# Patient Record
Sex: MALE | Race: AMERICAN INDIAN OR ALASKA NATIVE
[De-identification: names, ages, dates, MRNs, and addresses within clinical notes are randomized per-mention and may not be internally consistent; named-entity substitution may affect disease eponyms.]

---

## 2018-09-18 ENCOUNTER — HOSPITAL ENCOUNTER (EMERGENCY)
Dept: HOSPITAL 42 - ED | Age: 28
Discharge: HOME | End: 2018-09-18
Payer: COMMERCIAL

## 2018-09-18 VITALS — HEART RATE: 64 BPM | TEMPERATURE: 98.1 F | RESPIRATION RATE: 18 BRPM

## 2018-09-18 VITALS — OXYGEN SATURATION: 98 % | SYSTOLIC BLOOD PRESSURE: 109 MMHG | DIASTOLIC BLOOD PRESSURE: 72 MMHG

## 2018-09-18 DIAGNOSIS — R07.81: Primary | ICD-10-CM

## 2018-09-18 PROCEDURE — 99284 EMERGENCY DEPT VISIT MOD MDM: CPT

## 2018-09-18 PROCEDURE — 96372 THER/PROPH/DIAG INJ SC/IM: CPT

## 2018-09-18 PROCEDURE — 71101 X-RAY EXAM UNILAT RIBS/CHEST: CPT

## 2018-09-18 NOTE — RAD
Date of service: 



09/18/2018



PROCEDURE:  Radiographs of the Chest and Right Ribs.



HISTORY:

ribs pain



COMPARISON:

None available. 



TECHNIQUE:

Frontal radiograph of the chest and multiple oblique radiographs of 

the right ribs were obtained.



FINDINGS:



RIGHT RIBS:

No fracture or focal lesion visualized.



LUNGS:

Clear.



PLEURA:

No pneumothorax or pleural fluid.



CARDIOVASCULAR:

Normal sized heart. No pulmonary vascular congestion.



OTHER FINDINGS:

None.



IMPRESSION:

Unremarkable radiographs of the chest and right ribs. No right rib 

fracture.

## 2018-09-18 NOTE — ED PDOC
Arrival/HPI





- General


Chief Complaint: Rib Injury


Time Seen by Provider: 09/18/18 12:14


Historian: Patient





- History of Present Illness


Narrative History of Present Illness (Text): 





09/18/18 13:50


27yo male with no pmhx who present with right sided anterior ribs pain x 4days. 

Notes that pain is with deep inspiration, sharp. Denies trauma, cough, SOB, 

diaphoresis, fever, chills, dizziness, any other complaint. Did not take any 

analgesic.





Past Medical History





- Provider Review


Nursing Documentation Reviewed: Yes





- Psychiatric


Hx Substance Use: No





Family/Social History





- Physician Review


Nursing Documentation Reviewed: Yes


Family/Social History: Unknown Family HX


Smoking Status: Never Smoked


Hx Alcohol Use: Yes


Frequency of alcohol use: Socially


Hx Substance Use: No





Allergies/Home Meds


Allergies/Adverse Reactions: 


Allergies





No Known Allergies Allergy (Verified 09/18/18 12:03)


 











Review of Systems





- Physician Review


All systems were reviewed & negative as marked: Yes





- Review of Systems


Constitutional: Normal


Eyes: Normal


ENT: Normal


Respiratory: Normal


Cardiovascular: Normal


Gastrointestinal: Normal


Genitourinary Male: Normal


Musculoskeletal: Arthralgias (Right sided rib pain)


Skin: Normal


Neurological: Normal


Endocrine: Normal


Hemo/Lymphatic: Normal


Psychiatric: Normal





Physical Exam


Vital Signs Reviewed: Yes





Vital Signs











  Temp Pulse Resp BP Pulse Ox


 


 09/18/18 12:00  98.1 F  64  18  120/74  99











Temperature: Afebrile


Blood Pressure: Normal


Pulse: Regular


Respiratory Rate: Normal


Appearance: Positive for: Well-Appearing, Non-Toxic, Comfortable


Pain Distress: None


Mental Status: Positive for: Alert and Oriented X 3





- Systems Exam


Head: Present: Atraumatic, Normocephalic


Pupils: Present: PERRL


Extroacular Muscles: Present: EOMI


Conjunctiva: Present: Normal


Mouth: Present: Moist Mucous Membranes


Neck: Present: Normal Range of Motion


Respiratory/Chest: Present: Clear to Auscultation, Good Air Exchange, Tender to 

Palpation (Focal right sided anterior lower rib tenderness).  No: Respiratory 

Distress, Accessory Muscle Use, Wheezes, Decreased Breath Sounds, Rales, 

Retracting, Rhonchi


Cardiovascular: Present: Regular Rate and Rhythm, Normal S1, S2.  No: Murmurs


Abdomen: No: Tenderness, Distention, Peritoneal Signs


Back: Present: Normal Inspection


Upper Extremity: Present: Normal Inspection.  No: Cyanosis, Edema


Lower Extremity: Present: Normal Inspection.  No: Edema


Neurological: Present: GCS=15, CN II-XII Intact, Speech Normal


Skin: Present: Warm, Dry, Normal Color.  No: Rashes


Psychiatric: Present: Alert, Oriented x 3, Normal Insight, Normal Concentration





Medical Decision Making


ED Course and Treatment: 





09/18/18 13:52


Pt in ED for stated history. In no distress. Hemodynamically stable. He had 

focal tenderness over right lower ribs.





right rib/CXR - No fracture/dislocation. No PTX





Pt's pain improved in ED with medication. Result was DW the pt and he was 

referred to his PMD.





- RAD Interpretation


Radiology Orders: 











09/18/18 12:18


RIBS RIGHT & PA CHEST [RAD] Stat 














- Medication Orders


Current Medication Orders: 














Discontinued Medications





Ketorolac Tromethamine (Toradol)  60 mg IM STAT STA


   Stop: 09/18/18 12:19


   Last Admin: 09/18/18 12:45  Dose: 60 mg





MAR Pain Assessment


 Document     09/18/18 12:45  GMD  (Rec: 09/18/18 12:45  GMD  [id number])


     Pain Reassessment


      Is this a pain reassessment?               No


IM Administration Charges


 Document     09/18/18 12:45  GMD  (Rec: 09/18/18 12:45  GMD  [id number])


     Injection Site


      MAR Injection Site                         Left Deltoid


     Charges for Administration


      # of IM Administrations                    1














Disposition/Present on Arrival





- Present on Arrival


Any Indicators Present on Arrival: No


History of DVT/PE: No


History of Uncontrolled Diabetes: No


Urinary Catheter: No


History of Decub. Ulcer: No


History Surgical Site Infection Following: None





- Disposition


Have Diagnosis and Disposition been Completed?: Yes


Diagnosis: 


 Rib pain





Disposition: HOME/ ROUTINE


Disposition Time: 13:55


Patient Plan: Discharge


Condition: STABLE


Discharge Instructions (ExitCare):  Chest Pain (ED)


Additional Instructions: 


Follow up with your Doctor


Return to ED for any new or worsening symptoms


Prescriptions: 


Cyclobenzaprine [Cyclobenzaprine HCl] 10 mg PO TID #12 tab


Naproxen [Naprosyn] 500 mg PO BID #20 tablet


Referrals: 


Sarah Rodrigues MD [Medical Doctor] - Follow up with primary